# Patient Record
Sex: FEMALE | Race: WHITE | NOT HISPANIC OR LATINO | URBAN - METROPOLITAN AREA
[De-identification: names, ages, dates, MRNs, and addresses within clinical notes are randomized per-mention and may not be internally consistent; named-entity substitution may affect disease eponyms.]

---

## 2017-11-06 NOTE — PATIENT DISCUSSION
HAVING DIF - OS FOR SOME TIME- CAT SURGERY HELPED BUT NOW GETTING XI AGAIN - OD MOST LIKELY CONTRIBUTING TO RECENT REDUCTIONS - PROM FUCHS - NO SIG RETINAL CHANGE TO EXPLAIN REDUCTION IN [place].

## 2019-06-17 NOTE — PATIENT DISCUSSION
BORDERLINE OD. Electrodesiccation Text: The wound bed was treated with electrodesiccation after the biopsy was performed.

## 2022-03-08 ENCOUNTER — NEW PATIENT (OUTPATIENT)
Dept: URBAN - METROPOLITAN AREA CLINIC 9 | Facility: CLINIC | Age: 67
End: 2022-03-08

## 2022-03-08 DIAGNOSIS — H02.834: ICD-10-CM

## 2022-03-08 DIAGNOSIS — H02.831: ICD-10-CM

## 2022-03-08 PROCEDURE — 92082 INTERMEDIATE VISUAL FIELD XM: CPT

## 2022-03-08 PROCEDURE — 99204 OFFICE O/P NEW MOD 45 MIN: CPT

## 2022-03-08 PROCEDURE — 92285 EXTERNAL OCULAR PHOTOGRAPHY: CPT

## 2022-03-08 ASSESSMENT — VISUAL ACUITY
OS_CC: 20/40
OD_CC: 20/30

## 2022-11-04 ENCOUNTER — POST-OP (OUTPATIENT)
Dept: URBAN - METROPOLITAN AREA CLINIC 14 | Facility: CLINIC | Age: 67
End: 2022-11-04

## 2022-11-04 DIAGNOSIS — H02.834: ICD-10-CM

## 2022-11-04 DIAGNOSIS — H02.831: ICD-10-CM

## 2022-11-04 DIAGNOSIS — Z98.890: ICD-10-CM

## 2022-11-04 PROCEDURE — 99024 POSTOP FOLLOW-UP VISIT: CPT

## 2022-11-04 ASSESSMENT — VISUAL ACUITY
OD_CC: 20/30
OS_CC: 20/30

## 2023-08-29 NOTE — PATIENT DISCUSSION
Sutures removed without difficulty or complications. Topical Metronidazole Pregnancy And Lactation Text: This medication is Pregnancy Category B and considered safe during pregnancy.  It is also considered safe to use while breastfeeding.